# Patient Record
(demographics unavailable — no encounter records)

---

## 2025-02-12 NOTE — PLAN
[FreeTextEntry1] :   Encounter for preventative adult health care examination (V70.0) (Z00.00)  -Tdap 2024 UTD  -HIV, Hep C negative  -LDL, A1C, CMP, Urine albumin/creatinine  -Colonoscopy 2 yrs, per pt normal   Can RTC in 6 months  Seen with Dr. CHIU

## 2025-02-12 NOTE — PHYSICAL EXAM
[No Acute Distress] : no acute distress [Well Nourished] : well nourished [PERRL] : pupils equal round and reactive to light [EOMI] : extraocular movements intact [Normal Oropharynx] : the oropharynx was normal [No Respiratory Distress] : no respiratory distress  [No Accessory Muscle Use] : no accessory muscle use [Normal Rate] : normal rate  [Regular Rhythm] : with a regular rhythm [Soft] : abdomen soft [Non Tender] : non-tender [No HSM] : no HSM [No Joint Swelling] : no joint swelling [No Rash] : no rash [Coordination Grossly Intact] : coordination grossly intact [No Focal Deficits] : no focal deficits [Speech Grossly Normal] : speech grossly normal [Memory Grossly Normal] : memory grossly normal [Normal Affect] : the affect was normal [Normal Mood] : the mood was normal [Normal Insight/Judgement] : insight and judgment were intact

## 2025-02-12 NOTE — HEALTH RISK ASSESSMENT
[No] : No [No falls in past year] : Patient reported no falls in the past year [Little interest or pleasure doing things] : 1) Little interest or pleasure doing things [Feeling down, depressed, or hopeless] : 2) Feeling down, depressed, or hopeless [PHQ-2 Negative - No further assessment needed] : PHQ-2 Negative - No further assessment needed [Never] : Never [NO] : No [With Family] : lives with family [Employed] : employed [College] : College [] :  [Fully functional (bathing, dressing, toileting, transferring, walking, feeding)] : Fully functional (bathing, dressing, toileting, transferring, walking, feeding) [Fully functional (using the telephone, shopping, preparing meals, housekeeping, doing laundry, using] : Fully functional and needs no help or supervision to perform IADLs (using the telephone, shopping, preparing meals, housekeeping, doing laundry, using transportation, managing medications and managing finances) [[id number]] : 0

## 2025-02-12 NOTE — END OF VISIT
[] : Resident [FreeTextEntry3] : Pt with elevated urine protein in the past, would recheck, and if still high would rx sujata-protective BP meds. Check other CVD risks (A1c, lipids).

## 2025-02-12 NOTE — HISTORY OF PRESENT ILLNESS
[FreeTextEntry1] : CPE  [de-identified] : Raz Escobedo is a 47M  with PMH of HTN presenting for CPE. BP is 125/72, pt checks it 3x a wk. Pt takes meds consistently. Pt has no additional complaints. Pt walks 3x a wk. Pt is trying to eat healthier and is trying to eat at home and is eating wheat bread.